# Patient Record
Sex: FEMALE | Race: WHITE | URBAN - METROPOLITAN AREA
[De-identification: names, ages, dates, MRNs, and addresses within clinical notes are randomized per-mention and may not be internally consistent; named-entity substitution may affect disease eponyms.]

---

## 2024-01-11 ENCOUNTER — TELEPHONE (OUTPATIENT)
Age: 19
End: 2024-01-11

## 2024-01-11 NOTE — TELEPHONE ENCOUNTER
pt's mum cancelled the appt because she said that her daughter got another doctor closer to where they live other then them driving 3 hours to come here to [place]. 1/11/24 .